# Patient Record
Sex: FEMALE | Race: WHITE | NOT HISPANIC OR LATINO | URBAN - METROPOLITAN AREA
[De-identification: names, ages, dates, MRNs, and addresses within clinical notes are randomized per-mention and may not be internally consistent; named-entity substitution may affect disease eponyms.]

---

## 2021-09-24 ENCOUNTER — OFFICE (OUTPATIENT)
Dept: URBAN - METROPOLITAN AREA CLINIC 13 | Facility: CLINIC | Age: 76
End: 2021-09-24

## 2021-09-24 VITALS
WEIGHT: 190 LBS | DIASTOLIC BLOOD PRESSURE: 82 MMHG | HEART RATE: 78 BPM | SYSTOLIC BLOOD PRESSURE: 133 MMHG | HEIGHT: 68 IN | TEMPERATURE: 97.2 F

## 2021-09-24 DIAGNOSIS — K58.9 IRRITABLE BOWEL SYNDROME: ICD-10-CM

## 2021-09-24 DIAGNOSIS — F41.9 ANXIETY DISORDER: ICD-10-CM

## 2021-09-24 DIAGNOSIS — F32.9 DEPRESSIVE DISORDER: ICD-10-CM

## 2021-09-24 PROCEDURE — 99205 OFFICE O/P NEW HI 60 MIN: CPT | Performed by: INTERNAL MEDICINE

## 2021-09-24 RX ORDER — DICYCLOMINE HYDROCHLORIDE 10 MG/1
CAPSULE ORAL
Qty: 90 | Status: COMPLETED
End: 2022-12-02

## 2021-09-24 NOTE — SERVICEHPINOTES
Her principle complaint is that she becomes exhausted very easily and is unable to walk more than a short distance or engage in any other exercise.  She feels this is related to her gastrointestinal tract.  In October 2020 she underwent a colonoscopy, I believe in [place].  Apparently the procedure was very difficult.  She was told afterward that she was screaming in pain though she does not have clear recollection of the procedure.  She was under the impression that the procedure was not completed but among her records is a report of the colonoscopy which shows that the cecum was reached.  Diverticulosis was noted and 2 or 3 small hyperplastic polyps were removed.  She also has records of colonoscopy that was done in February 2017, apparently for rectal bleeding.  Findings with the same on that exam, again a small hyperplastic polyp was removed.  She had a CAT scan done just within the past few weeks which showed no significant abnormalities in the abdomen, only diverticulosis. She also had one done in 11-20 when she had diverticulitis. She says that at times she she believes there is blood in her stool now.  She does not see kameron blood but after the stools in the water she believes there is blood coming from the stool.  Since May of this year she's had a marked change in her bowel habits.  Generally her stools would be small pellets only.  MiraLAX was recommended which she has been taking and when she does so, daily, the stool is like "worms", 2-4 bowel movements every morning.  She also has lower abdominal pain and bloating.  Hydrocodone, which she takes for her hips, helps the symptom as well as the profound weakness she feels.  She has a history of depression and is currently on Prozac.  She also takes zolpidem at night and frequently Ativan.  Mylanta sometimes helps her abdominal symptoms.  She had a number of blood tests done by her primary care physician and apparently one of the thyroid antibodies is significantly elevated.  She is scheduled to see an endocrinologist.

## 2021-11-19 ENCOUNTER — OFFICE (OUTPATIENT)
Dept: URBAN - METROPOLITAN AREA CLINIC 13 | Facility: CLINIC | Age: 76
End: 2021-11-19

## 2021-11-19 VITALS
TEMPERATURE: 97.8 F | DIASTOLIC BLOOD PRESSURE: 85 MMHG | HEIGHT: 68 IN | WEIGHT: 198 LBS | SYSTOLIC BLOOD PRESSURE: 125 MMHG | HEART RATE: 87 BPM

## 2021-11-19 DIAGNOSIS — F41.9 ANXIETY DISORDER: ICD-10-CM

## 2021-11-19 DIAGNOSIS — K58.9 IRRITABLE BOWEL SYNDROME: ICD-10-CM

## 2021-11-19 PROCEDURE — 99214 OFFICE O/P EST MOD 30 MIN: CPT | Performed by: INTERNAL MEDICINE

## 2021-11-19 NOTE — SERVICEHPINOTES
She is better.  She feels that the dicyclomine has helped considerably.  She is taking it twice a day and instead of having symptoms every day she has symptoms 2 or 3 times a week.  If she has a bowel movement in the morning she will generally not have symptoms.  If not, she has the episodes of extreme weakness and asked to stop if she is walking or doing other activities.  She still sometimes has loose stools, she has not really become constipated except if she takes hydrocodone.  In such instances she uses dieters tea(senna). If she takes that medicationFONT style="BACKGROUND-COLOR: #ffffff", all her symptoms resolved and she feels normal for a whole day.  She uses this about once a week.  She is not using MiraLAX.  /DONGT

## 2022-01-27 ENCOUNTER — OFFICE (OUTPATIENT)
Dept: URBAN - METROPOLITAN AREA CLINIC 13 | Facility: CLINIC | Age: 77
End: 2022-01-27

## 2022-01-27 VITALS
DIASTOLIC BLOOD PRESSURE: 74 MMHG | TEMPERATURE: 97.6 F | WEIGHT: 198 LBS | HEIGHT: 68 IN | SYSTOLIC BLOOD PRESSURE: 102 MMHG | HEART RATE: 80 BPM

## 2022-01-27 DIAGNOSIS — K58.9 IRRITABLE BOWEL SYNDROME: ICD-10-CM

## 2022-01-27 DIAGNOSIS — R19.5 OCCULT BLOOD IN STOOLS: ICD-10-CM

## 2022-01-27 DIAGNOSIS — F41.9 ANXIETY DISORDER: ICD-10-CM

## 2022-01-27 DIAGNOSIS — F32.9 DEPRESSIVE DISORDER: ICD-10-CM

## 2022-01-27 PROCEDURE — 99214 OFFICE O/P EST MOD 30 MIN: CPT | Performed by: INTERNAL MEDICINE

## 2022-01-27 NOTE — SERVICEHPINOTES
Her symptoms are really unchanged.  These include intermittent lower abdominal pain, frequent urge to have a bowel movement including pressure in the rectum though she does not have diarrhea.  Stools are either formed or occasional she has hard stools or even "pellets".  She constantly feels exhausted and doesn't have the energy to take a shower or go out.  She took 10 mg of amitriptyline at bedtime for a week and noticed no change.  She is just increased it to 2 at bedtime.  She stop the dicyclomine because she did not know whether she could use the 2 medications together.  At that point it did not really seem to be helping her, anyway.  She continues to sleep poorly despite taking extended release Ambien at bedtime and often taking a second one when she wakes up at 2 or 3 AM.  2 stools were tested for occult blood.  The first one was negative and the second one was positive.  However, she tells me that for the past months she's been taking 2 ibuprofen daily into acetaminophen daily for the bursitis she hasn't both hips.  These were prescribed by the orthopedist.  He previously injected both hips which helped transiently.

## 2022-01-28 LAB
C-REACTIVE PROTEIN, QUANT: 14 MG/L — HIGH (ref 0–10)
CBC WITH DIFFERENTIAL/PLATELET: BASO (ABSOLUTE): 0 X10E3/UL (ref 0–0.2)
CBC WITH DIFFERENTIAL/PLATELET: BASOS: 1 %
CBC WITH DIFFERENTIAL/PLATELET: EOS (ABSOLUTE): 0.2 X10E3/UL (ref 0–0.4)
CBC WITH DIFFERENTIAL/PLATELET: EOS: 4 %
CBC WITH DIFFERENTIAL/PLATELET: HEMATOCRIT: 41.9 % (ref 34–46.6)
CBC WITH DIFFERENTIAL/PLATELET: HEMATOLOGY COMMENTS: (no result)
CBC WITH DIFFERENTIAL/PLATELET: HEMOGLOBIN: 13.9 G/DL (ref 11.1–15.9)
CBC WITH DIFFERENTIAL/PLATELET: IMMATURE CELLS: (no result)
CBC WITH DIFFERENTIAL/PLATELET: IMMATURE GRANS (ABS): 0 X10E3/UL (ref 0–0.1)
CBC WITH DIFFERENTIAL/PLATELET: IMMATURE GRANULOCYTES: 0 %
CBC WITH DIFFERENTIAL/PLATELET: LYMPHS (ABSOLUTE): 1.4 X10E3/UL (ref 0.7–3.1)
CBC WITH DIFFERENTIAL/PLATELET: LYMPHS: 25 %
CBC WITH DIFFERENTIAL/PLATELET: MCH: 31.7 PG (ref 26.6–33)
CBC WITH DIFFERENTIAL/PLATELET: MCHC: 33.2 G/DL (ref 31.5–35.7)
CBC WITH DIFFERENTIAL/PLATELET: MCV: 95 FL (ref 79–97)
CBC WITH DIFFERENTIAL/PLATELET: MONOCYTES(ABSOLUTE): 0.6 X10E3/UL (ref 0.1–0.9)
CBC WITH DIFFERENTIAL/PLATELET: MONOCYTES: 11 %
CBC WITH DIFFERENTIAL/PLATELET: NEUTROPHILS (ABSOLUTE): 3.3 X10E3/UL (ref 1.4–7)
CBC WITH DIFFERENTIAL/PLATELET: NEUTROPHILS: 59 %
CBC WITH DIFFERENTIAL/PLATELET: NRBC: (no result)
CBC WITH DIFFERENTIAL/PLATELET: PLATELETS: 209 X10E3/UL (ref 150–450)
CBC WITH DIFFERENTIAL/PLATELET: RBC: 4.39 X10E6/UL (ref 3.77–5.28)
CBC WITH DIFFERENTIAL/PLATELET: RDW: 11.7 % (ref 11.7–15.4)
CBC WITH DIFFERENTIAL/PLATELET: WBC: 5.6 X10E3/UL (ref 3.4–10.8)
COMP. METABOLIC PANEL (14): A/G RATIO: 1.8 (ref 1.2–2.2)
COMP. METABOLIC PANEL (14): ALBUMIN: 4.5 G/DL (ref 3.7–4.7)
COMP. METABOLIC PANEL (14): ALKALINE PHOSPHATASE: 78 IU/L (ref 44–121)
COMP. METABOLIC PANEL (14): ALT (SGPT): 16 IU/L (ref 0–32)
COMP. METABOLIC PANEL (14): AST (SGOT): 21 IU/L (ref 0–40)
COMP. METABOLIC PANEL (14): BILIRUBIN, TOTAL: 0.5 MG/DL (ref 0–1.2)
COMP. METABOLIC PANEL (14): BUN/CREATININE RATIO: 20 (ref 12–28)
COMP. METABOLIC PANEL (14): BUN: 17 MG/DL (ref 8–27)
COMP. METABOLIC PANEL (14): CALCIUM: 10.1 MG/DL (ref 8.7–10.3)
COMP. METABOLIC PANEL (14): CARBON DIOXIDE, TOTAL: 22 MMOL/L (ref 20–29)
COMP. METABOLIC PANEL (14): CHLORIDE: 103 MMOL/L (ref 96–106)
COMP. METABOLIC PANEL (14): CREATININE: 0.85 MG/DL (ref 0.57–1)
COMP. METABOLIC PANEL (14): EGFR IF AFRICN AM: 77 ML/MIN/1.73 (ref 59–?)
COMP. METABOLIC PANEL (14): EGFR IF NONAFRICN AM: 67 ML/MIN/1.73 (ref 59–?)
COMP. METABOLIC PANEL (14): GLOBULIN, TOTAL: 2.5 G/DL (ref 1.5–4.5)
COMP. METABOLIC PANEL (14): GLUCOSE: 93 MG/DL (ref 65–99)
COMP. METABOLIC PANEL (14): POTASSIUM: 4.8 MMOL/L (ref 3.5–5.2)
COMP. METABOLIC PANEL (14): PROTEIN, TOTAL: 7 G/DL (ref 6–8.5)
COMP. METABOLIC PANEL (14): SODIUM: 138 MMOL/L (ref 134–144)

## 2022-06-15 ENCOUNTER — OFFICE (OUTPATIENT)
Dept: URBAN - METROPOLITAN AREA CLINIC 13 | Facility: CLINIC | Age: 77
End: 2022-06-15

## 2022-06-15 VITALS
DIASTOLIC BLOOD PRESSURE: 82 MMHG | HEART RATE: 104 BPM | TEMPERATURE: 98.2 F | WEIGHT: 191 LBS | HEIGHT: 68 IN | SYSTOLIC BLOOD PRESSURE: 118 MMHG

## 2022-06-15 DIAGNOSIS — F41.9 ANXIETY DISORDER: ICD-10-CM

## 2022-06-15 DIAGNOSIS — K57.30 DIVERTICULOSIS OF COLON: ICD-10-CM

## 2022-06-15 DIAGNOSIS — F32.9 DEPRESSIVE DISORDER: ICD-10-CM

## 2022-06-15 DIAGNOSIS — K58.9 IRRITABLE BOWEL SYNDROME: ICD-10-CM

## 2022-06-15 PROCEDURE — 99214 OFFICE O/P EST MOD 30 MIN: CPT | Performed by: INTERNAL MEDICINE

## 2022-06-15 NOTE — SERVICEHPINOTES
She says that her symptoms are exactly the same as when she last saw me, which was in January of this year.  Her bowel habits are irregular and sometimes she sees what she believes is blood leaking out from the stool.  Stools can vary from loose to soft.  She increased her amitriptyline up to 40 mg at bedtime but this made her constipated.  She is generally taking 2 or 3 at bedtime and is not really sure whether it has made any difference.  She admits that she is extremely anxious and she has been diagnosed with "anxiety syndrome".  In addition to the amitriptyline, she takes Ambien every night for sleep and uses hydrocodone for pain.  She says she has an appointment to see a psychiatrist in the next couple of weeks. She reports that she is also seeing an infectious disease specialist.  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  She had stool studies ordered which were all negative, including stool for C. difficile toxin.  She again insisted that she needed additional testing done on her gastrointestinal tract.  I again told her she had 2 negative colonoscopies in the past 5 years as well as a negative upper endoscopy.  She also had a negative CT scan a year ago though it showed a small pulmonary nodule.  She told me she's been diagnosed with a 4 cm aortic aneurysm which she says is in the chest though I am not certain what study she had which showed this.  She insisted that she needed a CAT scan done of the abdomen, she feels there must be an abnormality there contributing to her symptoms.

## 2022-07-08 LAB
CT CHEST ABD PEL W CONTRAST: (no result)
Lab: (no result)

## 2022-09-12 ENCOUNTER — HOSPITAL ENCOUNTER (EMERGENCY)
Dept: HOSPITAL 4 - SED | Age: 77
Discharge: LEFT BEFORE DISCHARGE | End: 2022-09-12
Payer: COMMERCIAL

## 2022-09-12 VITALS — WEIGHT: 182 LBS | HEIGHT: 68 IN | BODY MASS INDEX: 27.58 KG/M2

## 2022-09-12 VITALS — SYSTOLIC BLOOD PRESSURE: 135 MMHG

## 2022-09-12 DIAGNOSIS — J02.9: Primary | ICD-10-CM

## 2022-09-12 DIAGNOSIS — Z53.21: ICD-10-CM

## 2022-09-12 DIAGNOSIS — H92.03: ICD-10-CM

## 2022-12-02 ENCOUNTER — OFFICE (OUTPATIENT)
Dept: URBAN - METROPOLITAN AREA CLINIC 13 | Facility: CLINIC | Age: 77
End: 2022-12-02

## 2022-12-02 VITALS
HEIGHT: 68 IN | HEART RATE: 100 BPM | WEIGHT: 177 LBS | TEMPERATURE: 97.8 F | DIASTOLIC BLOOD PRESSURE: 79 MMHG | SYSTOLIC BLOOD PRESSURE: 122 MMHG

## 2022-12-02 DIAGNOSIS — R63.4 WEIGHT LOSS: ICD-10-CM

## 2022-12-02 DIAGNOSIS — R63.0 ANOREXIA: ICD-10-CM

## 2022-12-02 PROCEDURE — 99214 OFFICE O/P EST MOD 30 MIN: CPT | Performed by: INTERNAL MEDICINE

## 2022-12-02 NOTE — SERVICEHPINOTES
Her principle gastrointestinal complaint currently is lack of appetite and weight loss.  She says she is lost about 25 pounds.  She has no appetite and when she does eat she feels full very quickly.  I last saw her in June when her main complaint was still loose stools.  She was using amitriptyline at the time with some benefit.  She says the diarrhea is no longer a problem and in fact she tends to become constipated now and occasionally takes a laxative.  Her anxiety is still an issue but she does not feel that this has produced or weight loss.  She did see a psychiatrist after I last saw her but she felt missed treated on the first visit and did not return there.  Following that she saw a psychologist but she did not find that beneficial.  I did arrange for her to have a CT scan of the chest abdomen and pelvis after our last visit and that showed nothing significant in the gastrointestinal tract.  In September she developed shingles involving the left side of her jaw mouth throat and ear.  However, she never developed any cutaneous lesions but was told nevertheless that this was the cause.  She continues to have areas of pinpoint tenderness over the jaw and ear.

## 2023-01-05 ENCOUNTER — AMBULATORY SURGICAL CENTER (OUTPATIENT)
Dept: URBAN - METROPOLITAN AREA SURGERY 6 | Facility: SURGERY | Age: 78
End: 2023-01-05

## 2023-01-05 VITALS
RESPIRATION RATE: 23 BRPM | SYSTOLIC BLOOD PRESSURE: 144 MMHG | TEMPERATURE: 97.3 F | HEART RATE: 98 BPM | HEIGHT: 68 IN | WEIGHT: 175 LBS | DIASTOLIC BLOOD PRESSURE: 81 MMHG | OXYGEN SATURATION: 94 %

## 2023-01-05 PROBLEM — K44.9 DIAPHRAGMATIC HERNIA WITHOUT OBSTRUCTION OR GANGRENE: Status: ACTIVE | Noted: 2023-01-05

## 2023-01-05 PROBLEM — K22.89 OTHER SPECIFIED DISEASE OF ESOPHAGUS: Status: ACTIVE | Noted: 2023-01-05

## 2023-01-05 NOTE — SERVICEHPINOTES
Her principle gastrointestinal complaint currently is lack of appetite and weight loss.  She says she is lost about 25 pounds.  She has no appetite and when she does eat she feels full very quickly.  I last saw her in June when her main complaint was still loose stools.  She was using amitriptyline at the time with some benefit.  She says the diarrhea is no longer a problem and in fact she tends to become constipated now and occasionally takes a laxative.  Her anxiety is still an issue but she does not feel that this has produced or weight loss.  She did see a psychiatrist after I last saw her but she felt she was mistreated on the first visit and did not return there.  Following that she saw a psychologist but she did not find that beneficial.  I did arrange for her to have a CT scan of the chest abdomen and pelvis after our last visit and that showed nothing significant in the gastrointestinal tract.  In September she developed shingles involving the left side of her jaw, mouth, throat and ear.  However, she never developed any cutaneous lesions but was told nevertheless that this was the cause.  She continues to have areas of pinpoint tenderness over the jaw and ear.

## 2023-01-09 LAB
PATHOLOGY REPORT: .: (no result)

## 2023-03-06 ENCOUNTER — OFFICE (OUTPATIENT)
Dept: URBAN - METROPOLITAN AREA CLINIC 13 | Facility: CLINIC | Age: 78
End: 2023-03-06

## 2023-03-06 VITALS
DIASTOLIC BLOOD PRESSURE: 79 MMHG | SYSTOLIC BLOOD PRESSURE: 112 MMHG | WEIGHT: 171 LBS | TEMPERATURE: 98.1 F | HEIGHT: 68 IN | HEART RATE: 100 BPM

## 2023-03-06 DIAGNOSIS — F41.9 ANXIETY DISORDER: ICD-10-CM

## 2023-03-06 DIAGNOSIS — F32.9 DEPRESSIVE DISORDER: ICD-10-CM

## 2023-03-06 DIAGNOSIS — R05 COUGH: ICD-10-CM

## 2023-03-06 DIAGNOSIS — R63.4 WEIGHT LOSS: ICD-10-CM

## 2023-03-06 DIAGNOSIS — R49.0 HOARSENESS: ICD-10-CM

## 2023-03-06 PROCEDURE — 99214 OFFICE O/P EST MOD 30 MIN: CPT | Performed by: INTERNAL MEDICINE

## 2023-03-06 RX ORDER — OMEPRAZOLE 40 MG/1
CAPSULE, DELAYED RELEASE ORAL
Qty: 60 | Status: COMPLETED
Start: 2023-01-06 | End: 2023-05-12

## 2023-03-06 NOTE — SERVICEHPINOTES
She says her appetite remains poor and she has lost a few additional pounds since I last saw her.  However her biggest complaint currently is that she has cough and hoarseness.  She did see an ENT doctor who told her that this is due to reflux(LPR).  She is taking 40 mg of omeprazole in the morning which I started her on after her endoscopy in January and she has not noticed any difference.  She occasionally takes 20 mg of famotidine in the evening.  She does not have heartburn and never has.  Her upper endoscopy showed a hiatal hernia and mild inflammation near the GE junction.  Her bowel habits are the same, occasional constipation but no diarrhea.  She is concerned about a bulge in the right lower quadrant which she feels could be a mass.  She has brought this to my attention before.  She has had an appendectomy and did have a ventral hernia repair.  She says the hernia was principally in that area.  Nothing of significance was seen on her CT scan.  She says that she has a history of an ovarian cyst or tumor and recently underwent a pelvic ultrasound though she does not receive the results as yet.

## 2023-05-12 ENCOUNTER — OFFICE (OUTPATIENT)
Dept: URBAN - METROPOLITAN AREA CLINIC 13 | Facility: CLINIC | Age: 78
End: 2023-05-12

## 2023-05-12 VITALS
DIASTOLIC BLOOD PRESSURE: 85 MMHG | SYSTOLIC BLOOD PRESSURE: 121 MMHG | TEMPERATURE: 98.4 F | WEIGHT: 176.4 LBS | HEIGHT: 68 IN | HEART RATE: 93 BPM

## 2023-05-12 DIAGNOSIS — F32.9 DEPRESSIVE DISORDER: ICD-10-CM

## 2023-05-12 DIAGNOSIS — R05 COUGH: ICD-10-CM

## 2023-05-12 DIAGNOSIS — F41.9 ANXIETY DISORDER: ICD-10-CM

## 2023-05-12 PROCEDURE — 99214 OFFICE O/P EST MOD 30 MIN: CPT | Performed by: INTERNAL MEDICINE

## 2023-05-12 NOTE — SERVICEHPINOTES
Her complaints are much the same as they have been on other visits.  She says she still experiences the "flushes" with the sensation the begins somewhere in the abdomen and then extends down into her legs at times her up into her chest.  These occur at night.  She says the only thing that seems to help this is hydrocodone.  She complains of weakness and fatigue and seems quite distraught when she describes her symptoms.  She says that she also still has the cough and doubling the dose of the omeprazole did not help.  She has stopped that medication altogether.

## 2024-03-20 ENCOUNTER — OFFICE (OUTPATIENT)
Dept: URBAN - METROPOLITAN AREA CLINIC 13 | Facility: CLINIC | Age: 79
End: 2024-03-20

## 2024-03-20 VITALS
HEART RATE: 94 BPM | HEIGHT: 68 IN | SYSTOLIC BLOOD PRESSURE: 142 MMHG | TEMPERATURE: 98.3 F | WEIGHT: 190 LBS | DIASTOLIC BLOOD PRESSURE: 83 MMHG

## 2024-03-20 DIAGNOSIS — K62.5 RECTAL BLEEDING: ICD-10-CM

## 2024-03-20 DIAGNOSIS — K62.3 PROLAPSE, RECTAL: ICD-10-CM

## 2024-03-20 DIAGNOSIS — Z86.010 PERSONAL HISTORY OF COLONIC POLYPS: ICD-10-CM

## 2024-03-20 PROCEDURE — 99204 OFFICE O/P NEW MOD 45 MIN: CPT | Performed by: INTERNAL MEDICINE

## 2024-03-20 RX ORDER — HYDROCORTISONE ACETATE 25 MG/1
SUPPOSITORY RECTAL
Qty: 15 | Status: ACTIVE
Start: 2024-03-20

## 2024-03-20 RX ORDER — LIDOCAINE HCL 4 %
LIQUID ROLL-ON (ML) TOPICAL
Qty: 1 | Status: ACTIVE
Start: 2024-03-20

## 2024-03-20 NOTE — SERVICEHPINOTES
Her principle complaints are anal pain and bleeding.  I have not seen her for almost a year but prior to that was having frequent contact with her.  Most of the symptoms she was having then have improved.  She was complaining of weakness, flushing, dysphagia and weight loss, among other things.  She did undergo an upper endoscopy in January of last year which was negative.  Since then she has gained considerable weight, about 15 pounds.  She complains of pain and burning at the anus and prolapsing of tissue out which can be painful which she then pushes back in.  This occurs principally with bowel movements.  She has had rectal bleeding for several years.  She says she was having at the time of her last colonoscopy which she believes was about 5 years ago.  She does have a history of polyps but she believes that was on a prior colonoscopy.  She used to have constipation but now tends more to have loose stools (which he complained about when I previously saw her).  However taking 3 Colace tablets at night seems to actually help compact her stool.  I had suggested that she try using Imodium before going out where she says works but she forgets to do it.

## 2024-03-28 ENCOUNTER — AMBULATORY SURGICAL CENTER (OUTPATIENT)
Dept: URBAN - METROPOLITAN AREA SURGERY 6 | Facility: SURGERY | Age: 79
End: 2024-03-28

## 2024-03-28 VITALS
OXYGEN SATURATION: 97 % | SYSTOLIC BLOOD PRESSURE: 130 MMHG | HEART RATE: 73 BPM | DIASTOLIC BLOOD PRESSURE: 81 MMHG | RESPIRATION RATE: 16 BRPM | HEIGHT: 68 IN | TEMPERATURE: 97.5 F | WEIGHT: 185 LBS

## 2024-03-28 DIAGNOSIS — K62.5 HEMORRHAGE OF ANUS AND RECTUM: ICD-10-CM

## 2024-03-28 DIAGNOSIS — K57.30 DIVERTICULOSIS OF LARGE INTESTINE WITHOUT PERFORATION OR ABS: ICD-10-CM

## 2024-03-28 PROBLEM — K63.5 POLYP OF COLON: Status: ACTIVE | Noted: 2024-03-28

## 2024-03-28 PROCEDURE — 45380 COLONOSCOPY AND BIOPSY: CPT | Performed by: INTERNAL MEDICINE

## 2024-04-24 ENCOUNTER — OFFICE (OUTPATIENT)
Dept: URBAN - METROPOLITAN AREA CLINIC 13 | Facility: CLINIC | Age: 79
End: 2024-04-24

## 2024-04-24 VITALS
HEART RATE: 96 BPM | WEIGHT: 195 LBS | TEMPERATURE: 98.2 F | DIASTOLIC BLOOD PRESSURE: 76 MMHG | SYSTOLIC BLOOD PRESSURE: 121 MMHG | HEIGHT: 68 IN

## 2024-04-24 DIAGNOSIS — R19.5 OCCULT BLOOD IN STOOLS: ICD-10-CM

## 2024-04-24 DIAGNOSIS — K57.30 DIVERTICULOSIS, COLON: ICD-10-CM

## 2024-04-24 PROCEDURE — 99213 OFFICE O/P EST LOW 20 MIN: CPT | Performed by: INTERNAL MEDICINE

## 2024-04-24 NOTE — SERVICEHPINOTES
She underwent a colonoscopy on March 28.  there were 3 very small polyps in the cecum and diverticulosis on the left side of the colon.  No other abnormalities were seen, including no hemorrhoids.  She continues to have the anal pain when the stool is hard.  Generally her bowel pattern is that she has a hard stool in the morning followed by a fairly normal stool, followed by a loose stool.  This occurs over several hours.  She takes 3 Colace at night which maybe helps a bit.  She has taken psyllium but only the tablets and never more than 1 or 2 a day.  She still sees what she thinks is blood at the bottom of the toilet if she does not flush right away after a bowel movement and leaves the stool sit in the water. On rare occasions she will see a bit of red blood on the paper. She did have a stool tested for occult blood several months ago which was positive.  I do not have that report but I do have a stool test for occult blood from 2022 which was positive.  Her hemoglobin in November 2023 was over 14 and she says that a more recent one was over 14 as well. She had an upper endoscopy in January 2023 which showed chronic changes from reflux but no ulceration and no Boudreaux's.  The remainder of the exam was negative.

## 2024-08-19 ENCOUNTER — OFFICE (OUTPATIENT)
Dept: URBAN - METROPOLITAN AREA CLINIC 13 | Facility: CLINIC | Age: 79
End: 2024-08-19

## 2024-08-19 VITALS
HEIGHT: 68 IN | DIASTOLIC BLOOD PRESSURE: 83 MMHG | HEART RATE: 92 BPM | SYSTOLIC BLOOD PRESSURE: 131 MMHG | WEIGHT: 199.4 LBS | TEMPERATURE: 97.2 F

## 2024-08-19 DIAGNOSIS — R19.5 OCCULT BLOOD POSITIVE STOOL: ICD-10-CM

## 2024-08-19 PROCEDURE — 99212 OFFICE O/P EST SF 10 MIN: CPT | Performed by: INTERNAL MEDICINE

## 2024-08-19 NOTE — SERVICEHPINOTES
She underwent a capsule study on July 23.  The exam was completely normal.  Currently, her GI complaints are few.  She is taking 3 fiber, he's a day and this has helped her constipation.  She does have a bowel movement daily.  She still never sees blood in the stool.  She has had a couple of positive stools for occult blood but a normal hemoglobin, normal recent upper endoscopy and colonoscopy, and now a normal capsule study.